# Patient Record
(demographics unavailable — no encounter records)

---

## 2017-08-08 NOTE — OP
DATE OF OPERATION:  08/08/2017

 

PREOPERATIVE DIAGNOSES:  Left ureteral calculus, left renal colic, and right renal

colic.

 

POSTOPERATIVE DIAGNOSES:  Left ureteral calculus, left renal colic, and right renal

colic.

 

PROCEDURE:  Cystoscopy, left ureteroscopy, laser lithotripsy, left ureteral stent

placement, retrograde pyelograms were performed, and right retrograde pyelogram was

performed.

 

FINDINGS:  Stone in the distal ureter on the left; medial left ureter has a stone. 

The lateral left ureter is clear, and the right ureter is clear.

 

SURGEON:  Swapnil Zapata MD

 

ANESTHESIA:  General.

 

PREOPERATIVE INDICATIONS:  The patient is a 53-year-old female with a history of

kidney stone.  She has a 1-cm stone on CAT scan.

 

DESCRIPTION OF OPERATION:  The patient was brought to the OR, placed on the table in

supine position, given general anesthesia and IV antibiotics, and placed in the

modified lithotomy position.  The groin was prepped and draped sterilely.

 

Cystoscopy was performed.  As previously noted, she has 2 left ureters.  The lateral

one which corresponded in this case to the lower pole was intubated, and a retrograde

pyelogram was performed.  The lower aspect of the kidney was opacified with contrast.

 It was dilated, and a semi-rigid scope was passed.  The course of the ureter was

clear without any evidence of stones.

 

The medial ureter was then intubated as well.  Retrograde pyelogram was performed

which revealed an upper pole moiety.  The scope was passed, and a large, 1-cm stone

corresponding with the CT scan's finding was seen.  Using the holmium laser fiber,

the stone was broken up into small pieces.  Those pieces were removed with a stone

basket.  No other stones were seen along the course of the ureter.  A 6 x 24,

double-J ureteral stent was placed with 1 loop in the upper pole moiety and the other

loop in the bladder.

 

On the right side, a retrograde pyelogram was performed.  The right UO, there was

only 1 that was visualized, was intubated, and retrograde pyelogram was performed. 

The collecting system appeared to be completely collapsed without any evidence of

hydronephrosis or filling defects.  The bladder was emptied.  The patient was woken

up.

 

 

SWAPNIL ZAPATA M.D.

 

[id number]

DD: 08/08/2017 13:30

DT: 08/08/2017 14:49

Job #:  [id number]

## 2019-03-20 NOTE — OP
Operative Note





- Note:


Operative Date: 03/20/19


Pre-Operative Diagnosis: Thick endom echo


Operation: Hysteroscopy, polypectomy, D&C


Findings: 





Large endomerial polyp, atrophic endometrial cavity


Post-Operative Diagnosis: Other (endoemtrial polyp)


Surgeon: Donte Fields


Anesthesiologist/CRNA: Noelle Brown MD


Anesthesia: General


Specimens Removed: endometrial polyp, endometrial curettings


Estimated Blood Loss (mls): 5


Blood Volume Replaced (mls): 0


Fluid Volume Replaced (mls): 300


Operative Report Dictated: Yes

## 2019-03-20 NOTE — HP
History & Physical Update





- History


History: No Change (Pt with menopause and thickened endometrial echo on imaging)





- Physical


Physical: No Change





- Assessment


Assessment: No Change (r/o endometrial mass or hyperplasia/malignancy)





- Plan


Plan: No Change


Currently as noted:: Hysteroscopy, D&C, excision of endom mass

## 2019-03-21 NOTE — OP
DATE OF OPERATION:  03/20/2019

 

PREOPERATIVE DIAGNOSES:  Thick endometrial echo in a postmenopausal patient.

 

POSTOPERATIVE DIAGNOSES:  Endometrial polyp.

 

PROCEDURE:  Hysteroscopy, polypectomy, dilatation and curettage.

 

SURGEON:  Gloria El MD

 

ASSISTANT:  None.

 

ANESTHESIOLOGIST:  Noelle Brown MD

 

ANESTHESIA:  General.

 

COMPLICATIONS:  None.

 

INTRAVENOUS FLUIDS:  300 mL.

 

URINE OUTPUT:  Not applicable.

 

ESTIMATED BLOOD LOSS:  5 mL.

 

PATHOLOGY:  Endometrial polyp, endometrial curettings.

 

FINDINGS:  Examination under anesthesia revealed a small slightly anteverted uterus

with no pelvic or adnexal masses noted.  Hysteroscopy revealed a large endometrial

polyp extending from the area of the uterine fundus all the way down to the internal

cervical os.  Once the polyp was excised, a normal atrophic uterine cavity was

visualized.

 

PROCEDURE:  The patient was met preoperatively.  Risks, benefits, and alternatives of

surgery were discussed in detail.  All questions were answered.  The patient was then

brought to the OR with the IV running.  She was placed on the surgical table in the

supine position.  The general anesthesia was achieved without difficulty.  The

patient was then placed in a dorsal lithotomy position using adjustable Abhishek

stirrups.  She was examined under anesthesia with the findings as described above.  A

timeout procedure was conducted as per standard protocol.  The patient was then

prepped and draped in the usual sterile fashion.  A sterile speculum was introduced

inside the vagina with good visualization of the cervix.  A single-toothed tenaculum

was then used to grasp the anterior cervical lip.  The cervical os was noted to be

stenotic, and multiple graduated dilators were used to dilate the cervical os.  Once

the cervical os was dilated, a hysteroscope was introduced into the uterine cavity. 

Inside the uterine cavity, a large endometrial polyp was noted.  A Symphion

hysteroscopy resectoscope was then used to completely excise endometrial polyp under

direct visualization and without complications.  Once the polyp was completely

excised, a normal, atrophic uterine cavity was noted.  There were no other masses or

lesions.  The hysteroscope was then removed from the patient.  Uterine curettage was

performed, and the tissue was sent to Pathology for evaluation.  Once the procedure

was completed, all of the instruments were removed from the patient.  Excellent

hemostasis was noted.  Sponge, lap, and instrument counts were correct.  The patient

was returned to supine position.  She was then transferred to recovery room awake and

in stable condition. 

 

 

GLORIA EL M.D.

 

[id number]

DD: 03/20/2019 16:32

DT: 03/21/2019 11:32

Job #:  [id number]

## 2019-03-22 NOTE — PATH
Surgical Pathology Report



Patient Name:  AMARI HOLLIS

Accession #:  [id number]

[facility]. Rec. #:  [id number]                                                        

   /Age/Gender:  1964 (Age: 54) / F

Account:  [id number]                                                          

             Location: [facility] SURGICAL

Taken:  3/20/2019

Received:  3/21/2019

Reported:  3/22/2019

Physicians:  Donte Fields M.D.

  



Specimen(s) Received

 ENDOMETRIAL CURETTINGS AND POLYP 





Clinical History

Endometrial hyperplasia







Final Diagnosis

ENDOMETRIAL POLYPS AND CURETTINGS:

FRAGMENTS OF ENDOMETRIAL POLYP.

SEPARATE FEW FRAGMENTS OF WEAKLY PROLIFERATIVE ENDOMETRIUM.





***Electronically Signed***

Dontrell Stinson M.D.





Gross Description

Received in formalin labeled "endometrial polyps and curettings," is a 3.3 x 2.4

x 0.3 cm aggregate of tan soft tissue fragments. The formalin is filtered and

the specimen is entirely submitted in 2 cassettes.

/3/21/2019



saudi/3/21/2019